# Patient Record
(demographics unavailable — no encounter records)

---

## 2017-04-05 RX ORDER — NORGESTREL-ETHINYL ESTRADIOL 0.3-0.03MG
TABLET ORAL
Qty: 84 TABLET | Refills: 0 | OUTPATIENT
Start: 2017-04-05

## 2017-04-05 NOTE — TELEPHONE ENCOUNTER
Pharmacy faxed over refill. Message left for patient needs appt. Message left with pharmacy-no refill. Needs appt.